# Patient Record
Sex: FEMALE | Race: WHITE | HISPANIC OR LATINO | Employment: STUDENT | URBAN - METROPOLITAN AREA
[De-identification: names, ages, dates, MRNs, and addresses within clinical notes are randomized per-mention and may not be internally consistent; named-entity substitution may affect disease eponyms.]

---

## 2017-03-22 ENCOUNTER — LAB VISIT (OUTPATIENT)
Dept: LAB | Facility: HOSPITAL | Age: 22
End: 2017-03-22
Attending: INTERNAL MEDICINE
Payer: COMMERCIAL

## 2017-03-22 ENCOUNTER — OFFICE VISIT (OUTPATIENT)
Dept: INTERNAL MEDICINE | Facility: CLINIC | Age: 22
End: 2017-03-22
Payer: COMMERCIAL

## 2017-03-22 VITALS
OXYGEN SATURATION: 98 % | TEMPERATURE: 98 F | HEIGHT: 61 IN | RESPIRATION RATE: 18 BRPM | WEIGHT: 99.19 LBS | DIASTOLIC BLOOD PRESSURE: 63 MMHG | HEART RATE: 77 BPM | SYSTOLIC BLOOD PRESSURE: 97 MMHG | BODY MASS INDEX: 18.73 KG/M2

## 2017-03-22 DIAGNOSIS — L65.9 HAIR LOSS: ICD-10-CM

## 2017-03-22 DIAGNOSIS — Z00.00 ANNUAL PHYSICAL EXAM: Primary | ICD-10-CM

## 2017-03-22 DIAGNOSIS — Z00.00 ANNUAL PHYSICAL EXAM: ICD-10-CM

## 2017-03-22 LAB
ALBUMIN SERPL BCP-MCNC: 3.7 G/DL
ALP SERPL-CCNC: 60 U/L
ALT SERPL W/O P-5'-P-CCNC: 14 U/L
ANION GAP SERPL CALC-SCNC: 4 MMOL/L
AST SERPL-CCNC: 15 U/L
BASOPHILS # BLD AUTO: 0.05 K/UL
BASOPHILS NFR BLD: 0.7 %
BILIRUB SERPL-MCNC: 0.4 MG/DL
BUN SERPL-MCNC: 7 MG/DL
CALCIUM SERPL-MCNC: 9.3 MG/DL
CHLORIDE SERPL-SCNC: 108 MMOL/L
CO2 SERPL-SCNC: 26 MMOL/L
CREAT SERPL-MCNC: 0.7 MG/DL
DIFFERENTIAL METHOD: ABNORMAL
EOSINOPHIL # BLD AUTO: 0.1 K/UL
EOSINOPHIL NFR BLD: 1.4 %
ERYTHROCYTE [DISTWIDTH] IN BLOOD BY AUTOMATED COUNT: 13.9 %
EST. GFR  (AFRICAN AMERICAN): >60 ML/MIN/1.73 M^2
EST. GFR  (NON AFRICAN AMERICAN): >60 ML/MIN/1.73 M^2
GLUCOSE SERPL-MCNC: 84 MG/DL
HCT VFR BLD AUTO: 39.9 %
HGB BLD-MCNC: 13 G/DL
LYMPHOCYTES # BLD AUTO: 2.2 K/UL
LYMPHOCYTES NFR BLD: 29.3 %
MCH RBC QN AUTO: 30.1 PG
MCHC RBC AUTO-ENTMCNC: 32.6 %
MCV RBC AUTO: 92 FL
MONOCYTES # BLD AUTO: 0.7 K/UL
MONOCYTES NFR BLD: 9.8 %
NEUTROPHILS # BLD AUTO: 4.3 K/UL
NEUTROPHILS NFR BLD: 57.8 %
PLATELET # BLD AUTO: 369 K/UL
PMV BLD AUTO: 10.3 FL
POTASSIUM SERPL-SCNC: 4 MMOL/L
PROT SERPL-MCNC: 7.3 G/DL
RBC # BLD AUTO: 4.32 M/UL
SODIUM SERPL-SCNC: 138 MMOL/L
T4 FREE SERPL-MCNC: 1.2 NG/DL
TSH SERPL DL<=0.005 MIU/L-ACNC: 2.44 UIU/ML
VIT B12 SERPL-MCNC: 485 PG/ML
WBC # BLD AUTO: 7.36 K/UL

## 2017-03-22 PROCEDURE — 82607 VITAMIN B-12: CPT

## 2017-03-22 PROCEDURE — 99213 OFFICE O/P EST LOW 20 MIN: CPT | Mod: PBBFAC,PO | Performed by: INTERNAL MEDICINE

## 2017-03-22 PROCEDURE — 85025 COMPLETE CBC W/AUTO DIFF WBC: CPT

## 2017-03-22 PROCEDURE — 84439 ASSAY OF FREE THYROXINE: CPT

## 2017-03-22 PROCEDURE — 99204 OFFICE O/P NEW MOD 45 MIN: CPT | Mod: S$GLB,,, | Performed by: INTERNAL MEDICINE

## 2017-03-22 PROCEDURE — 99999 PR PBB SHADOW E&M-EST. PATIENT-LVL III: CPT | Mod: PBBFAC,,, | Performed by: INTERNAL MEDICINE

## 2017-03-22 PROCEDURE — 84443 ASSAY THYROID STIM HORMONE: CPT

## 2017-03-22 PROCEDURE — 36415 COLL VENOUS BLD VENIPUNCTURE: CPT

## 2017-03-22 PROCEDURE — 80053 COMPREHEN METABOLIC PANEL: CPT

## 2017-03-22 NOTE — MR AVS SNAPSHOT
[facility]  [address]  [place] [address]  Phone: [phone]  Fax: [phone]                  Quinton Kc   3/22/2017 10:40 AM   Office Visit    Description:  Female : 1995   Provider:  Reina Chapman MD   Department:  [facility]           Reason for Visit     Establish Care           Diagnoses this Visit        Comments    Annual physical exam    -  Primary     Hair loss                To Do List           Future Appointments        Provider Department Dept Phone    3/22/2017 12:10 PM APPOINTMENT LAB, [facility] [phone]    2017 10:20 AM Reina Chapman MD [facility] [phone]      Goals (5 Years of Data)     None      Follow-Up and Disposition     Return in about 6 months (around 2017).      [facility]     [facility] -  Assistance  Registered nurses in the [facility] provide clinical advisement, health education, appointment booking, and other advisory services.  Call for this free service at [phone].             Medications           Message regarding Medications     Verify the changes and/or additions to your medication regime listed below are the same as discussed with your clinician today.  If any of these changes or additions are incorrect, please notify your healthcare provider.             Verify that the below list of medications is an accurate representation of the medications you are currently taking.  If none reported, the list may be blank. If incorrect, please contact your healthcare provider. Carry this list with you in case of emergency.           Current Medications     valacyclovir (VALTREX) 1000 MG tablet Take 1 tablet (1,000 mg total) by mouth 2 (two) times daily.           Clinical Reference Information           Your Vitals Were     BP Pulse Temp Resp Height Weight    97/63 77 98.3 °F (36.8 °C)  "(Oral) 18 5' 1" (1.549 m) 45 kg (99 lb 3.2 oz)    Last Period SpO2 BMI          03/14/2017 98% 18.74 kg/m2        Blood Pressure          Most Recent Value    BP  97/63      Allergies as of 3/22/2017     No Known Allergies      Immunizations Administered on Date of Encounter - 3/22/2017     None      Orders Placed During Today's Visit      Normal Orders This Visit    Ambulatory referral to Gynecology     Future Labs/Procedures Expected by Expires    CBC auto differential  3/22/2017 5/21/2018    Comprehensive metabolic panel  3/22/2017 5/21/2018    T4, free  3/22/2017 5/21/2018    TSH  3/22/2017 5/21/2018    Vitamin B12  3/22/2017 5/21/2018      Language Assistance Services     ATTENTION: Language assistance services are available, free of charge. Please call [phone].      ATENCIÓN: Si habla nataliya, tiene a hernandez disposición servicios gratuitos de asistencia lingüística. Llame al [phone].     CHÚ Ý: N?u b?n nói Ti?ng Vi?t, có các d?ch v? h? tr? ngôn ng? mi?n phí dành cho b?n. G?i s? [phone].         [facility] complies with applicable Federal civil rights laws and does not discriminate on the basis of race, color, national origin, age, disability, or sex.        "

## 2017-03-23 ENCOUNTER — OFFICE VISIT (OUTPATIENT)
Dept: OBSTETRICS AND GYNECOLOGY | Facility: CLINIC | Age: 22
End: 2017-03-23
Payer: COMMERCIAL

## 2017-03-23 VITALS
DIASTOLIC BLOOD PRESSURE: 74 MMHG | BODY MASS INDEX: 19.27 KG/M2 | HEIGHT: 61 IN | WEIGHT: 102.06 LBS | SYSTOLIC BLOOD PRESSURE: 102 MMHG

## 2017-03-23 DIAGNOSIS — Z01.419 WELL WOMAN EXAM WITH ROUTINE GYNECOLOGICAL EXAM: ICD-10-CM

## 2017-03-23 DIAGNOSIS — Z12.4 ROUTINE PAPANICOLAOU SMEAR: Primary | ICD-10-CM

## 2017-03-23 PROCEDURE — 99999 PR PBB SHADOW E&M-EST. PATIENT-LVL II: CPT | Mod: PBBFAC,,, | Performed by: OBSTETRICS & GYNECOLOGY

## 2017-03-23 PROCEDURE — 99385 PREV VISIT NEW AGE 18-39: CPT | Mod: S$GLB,,, | Performed by: OBSTETRICS & GYNECOLOGY

## 2017-03-23 PROCEDURE — 88175 CYTOPATH C/V AUTO FLUID REDO: CPT

## 2017-03-23 PROCEDURE — 99212 OFFICE O/P EST SF 10 MIN: CPT | Mod: PBBFAC,PO | Performed by: OBSTETRICS & GYNECOLOGY

## 2017-03-23 RX ORDER — NORGESTIMATE AND ETHINYL ESTRADIOL 7DAYSX3 LO
1 KIT ORAL DAILY
Qty: 28 TABLET | Refills: 11 | Status: SHIPPED | OUTPATIENT
Start: 2017-03-23 | End: 2018-03-08 | Stop reason: SDUPTHER

## 2017-03-23 NOTE — PROGRESS NOTES
"HPI:   21 y.o.   OB History      Para Term  AB TAB SAB Ectopic Multiple Living    2    2 2           Patient's last menstrual period was 2017 (exact date).    Patient is  here for her annual gynecologic exam.  She has no complaints.     ROS:  GENERAL: No fever, chills, fatigability or weight loss.  SKIN: No rashes, itching or changes in color or texture of skin.  HEAD: No headaches or recent head trauma.  EYES: Visual acuity fine. No photophobia, ocular pain or diplopia.  EARS: Denies ear pain, discharge or vertigo.  NOSE: No loss of smell, no epistaxis or postnasal drip.  MOUTH & THROAT: No hoarseness or change in voice. No excessive gum bleeding.  NODES: Denies swollen glands.  CHEST: Denies SHANKS, cyanosis, wheezing, cough and sputum production.  CARDIOVASCULAR: Denies chest pain, PND, orthopnea or reduced exercise tolerance.  ABDOMEN: Appetite fine. No weight loss. Denies diarrhea, abdominal pain, hematemesis or blood in stool.  URINARY: No flank pain, dysuria or hematuria.  PERIPHERAL VASCULAR: No claudication or cyanosis.  MUSCULOSKELETAL: No joint stiffness or swelling. Denies back pain.  NEUROLOGIC: No history of seizures, paralysis, alteration of gait or coordination.    PE:   /74  Ht 5' 1" (1.549 m)  Wt 46.3 kg (102 lb 1.2 oz)  LMP 2017 (Exact Date)  BMI 19.29 kg/m2  APPEARANCE: Well nourished, well developed, in no acute distress.  NECK: Neck symmetric without masses or thyromegaly.  BREASTS: Symmetrical, no skin changes or visible lesions. No palpable masses, nipple discharge or adenopathy bilaterally.  ABDOMEN: Flat. Soft. No tenderness or masses. No hepatosplenomegaly. No hernias. No CVA tenderness.  VULVA: No lesions. Normal female genitalia.  URETHRAL MEATUS: Normal size and location, no lesions, no prolapse.  URETHRA: No masses, tenderness, prolapse or scarring.  VAGINA: Moist and well rugated, no discharge, no significant cystocele or rectocele.  CERVIX: No lesions " and discharge. PAP done.  UTERUS: Normal size, regular shape, mobile, non-tender, bladder base nontender.  ADNEXA: No masses, tenderness or CDS nodularity.  ANUS PERINEUM: Normal.    PROCEDURES:  Pap smear    Assessment:  Normal Gynecologic Exam    Plan:  Mammogram and Colonoscopy if indicated by current recommendations.  Return to clinic in one year or for any problems or complaints.  Reg cycles  Wants ocp, on before

## 2017-03-29 NOTE — PROGRESS NOTES
Portions of this note are generated with voice recognition software. Typographical errors may exist.     Subjective:      Patient ID: Quinton Kc is a 21 y.o. female.    Chief Complaint: Establish Care    HPI:     Patient is here for an annual physical examination and to establish care with.  She denies any complaints.  On reviewing his she mentioned that she has had loss she noticed recently.  Patient denies any history of chest pain or shortness of breath.  No history of fever or chills.  No history of orthopnea or PND.  No history of dizziness or vertigo.  No history of nausea vomiting or diarrhea.  No history of hematemesis or melena.  No history of dysuria or hematuria.  No history of loss of strength in any extremities or loss of sensation.  No history of syncope seizures.                Review of patient's allergies indicates:  No Known Allergies  Past Medical History:   Diagnosis Date    Asthma     when younger     Past Surgical History:   Procedure Laterality Date    WISDOM TOOTH EXTRACTION       Family History   Problem Relation Age of Onset    Asthma Mother     Birth defects Neg Hx     Chromosomal disorder Neg Hx     Early death Neg Hx     Hyperlipidemia Neg Hx     Heart disease Neg Hx     Mental illness Neg Hx     Seizures Neg Hx     Thyroid disease Neg Hx     Other Neg Hx     Breast cancer Neg Hx     Colon cancer Neg Hx     Ovarian cancer Neg Hx     Diabetes Neg Hx     Hypertension Neg Hx      Social History     Social History    Marital status: Single     Spouse name: N/A    Number of children: N/A    Years of education: N/A     Occupational History    Not on file.     Social History Main Topics    Smoking status: Never Smoker    Smokeless tobacco: Not on file    Alcohol use No    Drug use: No    Sexual activity: Yes     Partners: Male     Birth control/ protection: Condom      Comment: condom use sometimes     Other Topics Concern    Not on file     Social History  Narrative    No petsLives with parents, 2 brothers, and grandparents         Review of SystemsAs above   Objective:     Physical Exam   Constitutional: She is oriented to person, place, and time. She appears well-developed and well-nourished. No distress.   HENT:   Head: Normocephalic and atraumatic.   Mouth/Throat: No oropharyngeal exudate.   Scalp no evidence of seborrhea.  No bruising had on pulling.   Eyes: EOM are normal. Pupils are equal, round, and reactive to light. Right eye exhibits no discharge. Left eye exhibits no discharge.   Neck: Normal range of motion. Neck supple. No tracheal deviation present. No thyromegaly present.   Cardiovascular: Normal rate and regular rhythm.  Exam reveals no friction rub.    No murmur heard.  Pulmonary/Chest: Effort normal and breath sounds normal. No respiratory distress. She has no wheezes. She has no rales.   Abdominal: Soft. Bowel sounds are normal. She exhibits no distension. There is no tenderness.   Musculoskeletal: Normal range of motion. She exhibits no edema or deformity.   Neurological: She is alert and oriented to person, place, and time. No cranial nerve deficit.   Skin: She is not diaphoretic.     Assessment:       Labs:    Recent Results (from the past 1008 hour(s))   CBC auto differential    Collection Time: 03/22/17 12:14 PM   Result Value Ref Range    WBC 7.36 3.90 - 12.70 K/uL    RBC 4.32 4.00 - 5.40 M/uL    Hemoglobin 13.0 12.0 - 16.0 g/dL    Hematocrit 39.9 37.0 - 48.5 %    MCV 92 82 - 98 fL    MCH 30.1 27.0 - 31.0 pg    MCHC 32.6 32.0 - 36.0 %    RDW 13.9 11.5 - 14.5 %    Platelets 369 (H) 150 - 350 K/uL    MPV 10.3 9.2 - 12.9 fL    Gran # 4.3 1.8 - 7.7 K/uL    Lymph # 2.2 1.0 - 4.8 K/uL    Mono # 0.7 0.3 - 1.0 K/uL    Eos # 0.1 0.0 - 0.5 K/uL    Baso # 0.05 0.00 - 0.20 K/uL    Gran% 57.8 38.0 - 73.0 %    Lymph% 29.3 18.0 - 48.0 %    Mono% 9.8 4.0 - 15.0 %    Eosinophil% 1.4 0.0 - 8.0 %    Basophil% 0.7 0.0 - 1.9 %    Differential Method Automated     Comprehensive metabolic panel    Collection Time: 03/22/17 12:14 PM   Result Value Ref Range    Sodium 138 136 - 145 mmol/L    Potassium 4.0 3.5 - 5.1 mmol/L    Chloride 108 95 - 110 mmol/L    CO2 26 23 - 29 mmol/L    Glucose 84 70 - 110 mg/dL    BUN, Bld 7 6 - 20 mg/dL    Creatinine 0.7 0.5 - 1.4 mg/dL    Calcium 9.3 8.7 - 10.5 mg/dL    Total Protein 7.3 6.0 - 8.4 g/dL    Albumin 3.7 3.5 - 5.2 g/dL    Total Bilirubin 0.4 0.1 - 1.0 mg/dL    Alkaline Phosphatase 60 55 - 135 U/L    AST 15 10 - 40 U/L    ALT 14 10 - 44 U/L    Anion Gap 4 (L) 8 - 16 mmol/L    eGFR if African American >60 >60 mL/min/1.73 m^2    eGFR if non African American >60 >60 mL/min/1.73 m^2   TSH    Collection Time: 03/22/17 12:14 PM   Result Value Ref Range    TSH 2.435 0.400 - 4.000 uIU/mL   T4, free    Collection Time: 03/22/17 12:14 PM   Result Value Ref Range    Free T4 1.20 0.71 - 1.51 ng/dL   Vitamin B12    Collection Time: 03/22/17 12:14 PM   Result Value Ref Range    Vitamin B-12 485 210 - 950 pg/mL     1. Annual physical exam    2. Hair loss      Patient is an overall good health.  Reassured her and explained to her about the phases of hair growth and loss.  No scalp condition causing hair loss.  We'll check the following labs and also vitamin B12 levels.  Refer to gynecology for Pap smear.  Follow-up in 6 months or earlier if required.        atient is an overall good health.  Procedures    CBC auto differential    Comprehensive metabolic panel    TSH    T4, free    Vitamin B12    Ambulatory referral to Gynecology     50 minutes spent during this visit of which greater than 50% devoted to face-face counseling and coordination of care regarding diagnosis and management plan

## 2017-05-01 ENCOUNTER — OFFICE VISIT (OUTPATIENT)
Dept: INTERNAL MEDICINE | Facility: CLINIC | Age: 22
End: 2017-05-01
Payer: COMMERCIAL

## 2017-05-01 VITALS
SYSTOLIC BLOOD PRESSURE: 95 MMHG | HEIGHT: 61 IN | DIASTOLIC BLOOD PRESSURE: 61 MMHG | OXYGEN SATURATION: 97 % | BODY MASS INDEX: 18.86 KG/M2 | HEART RATE: 81 BPM | WEIGHT: 99.88 LBS

## 2017-05-01 DIAGNOSIS — L65.9 HAIR LOSS: Primary | ICD-10-CM

## 2017-05-01 PROCEDURE — 99999 PR PBB SHADOW E&M-EST. PATIENT-LVL III: CPT | Mod: PBBFAC,,, | Performed by: INTERNAL MEDICINE

## 2017-05-01 PROCEDURE — 1160F RVW MEDS BY RX/DR IN RCRD: CPT | Mod: S$GLB,,, | Performed by: INTERNAL MEDICINE

## 2017-05-01 PROCEDURE — 99213 OFFICE O/P EST LOW 20 MIN: CPT | Mod: S$GLB,,, | Performed by: INTERNAL MEDICINE

## 2017-05-01 NOTE — PROGRESS NOTES
Subjective:    Portions of this note are generated with voice recognition software. Typographical errors may exist.   Patient ID: Quinton Kc is a 21 y.o. female.    Chief Complaint: Follow-up    HPI:  21-year-old girl here for a follow-up visit.  At previous visit she had mentioned that she has been having hair loss for the past several months.  No history of seborrhea or local lesions in the scalp.  She now mentions that she continues to have considerable hair loss and Review of patient's allergies  there has been thinning of her hair in front.  No changes in diet.  No history of weight loss or night sweats.  No new medications except for oral contraception.  No appetite changes.  No mood changes.    Past Medical History:   Diagnosis Date    Asthma     when younger     Past Surgical History:   Procedure Laterality Date    WISDOM TOOTH EXTRACTION       Family History   Problem Relation Age of Onset    Asthma Mother     Birth defects Neg Hx     Chromosomal disorder Neg Hx     Early death Neg Hx     Hyperlipidemia Neg Hx     Heart disease Neg Hx     Mental illness Neg Hx     Seizures Neg Hx     Thyroid disease Neg Hx     Other Neg Hx     Breast cancer Neg Hx     Colon cancer Neg Hx     Ovarian cancer Neg Hx     Diabetes Neg Hx     Hypertension Neg Hx      Social History     Social History    Marital status: Single     Spouse name: N/A    Number of children: N/A    Years of education: N/A     Occupational History    Not on file.     Social History Main Topics    Smoking status: Never Smoker    Smokeless tobacco: Not on file    Alcohol use No    Drug use: No    Sexual activity: Yes     Partners: Male     Birth control/ protection: Condom      Comment: condom use sometimes     Other Topics Concern    Not on file     Social History Narrative    No petsLives with parents, 2 brothers, and grandparents     ROS:  GENERAL:   SKIN:   HEAD: No headaches.  EYES: No Blurriing of vision  EARS: No ear  pain or changes in hearing.  NOSE: No congestion or rhinorrhea.  MOUTH & THROAT: No hoarseness, change in voice, or sore throat.  NODES: Denies swollen glands.  CHEST: Does chronically get some slight shortness of breath with going up her stairs. No chest pain. No acute worsening recently.  CARDIOVASCULAR: Denies chest pain, PND, orthopnea.  ABDOMEN: No nausea, vomiting, or changes in bowel function.  URINARY: No flank pain, dysuria or hematuria.  PERIPHERAL VASCULAR: No claudication or cyanosis.  MUSCULOSKELETAL: No joint stiffness or swelling. Denies back pain.  NEUROLOGIC: No weakness or numbness.    Physical Exam       Vital signs reviewed  PE:   APPEARANCE: Well nourished, well developed, in no acute distress.   HEAD: Normocephalic, atraumatic. Scalf: No local lesions.  Some thinning of hair the frontal area.    EYES: PERRL. EOMI. Conjunctivae noninjected.  EARS: TM's intact. Light reflex normal. No retraction or perforation  NOSE: Mucosa pink. Airway clear.  MOUTH & THROAT: No tonsillar enlargement. No pharyngeal erythema or exudate.   NECK: Supple with no cervical lymphadenopathy. No carotid bruits. No thyromegaly  CHEST: Good inspiratory effort. Lungs clear to auscultation with no wheezes or crackles.  CARDIOVASCULAR: Normal S1, S2. No rubs, murmurs, or gallops.  ABDOMEN: Bowel sounds normal. Not distended. Soft. No tenderness or masses. No organomegaly.  EXTREMITIES: No edema, cyanosis, or clubbing.     Assessment:     Labs:    Recent Results (from the past 1008 hour(s))   CBC auto differential    Collection Time: 03/22/17 12:14 PM   Result Value Ref Range    WBC 7.36 3.90 - 12.70 K/uL    RBC 4.32 4.00 - 5.40 M/uL    Hemoglobin 13.0 12.0 - 16.0 g/dL    Hematocrit 39.9 37.0 - 48.5 %    MCV 92 82 - 98 fL    MCH 30.1 27.0 - 31.0 pg    MCHC 32.6 32.0 - 36.0 %    RDW 13.9 11.5 - 14.5 %    Platelets 369 (H) 150 - 350 K/uL    MPV 10.3 9.2 - 12.9 fL    Gran # 4.3 1.8 - 7.7 K/uL    Lymph # 2.2 1.0 - 4.8 K/uL    Mono  # 0.7 0.3 - 1.0 K/uL    Eos # 0.1 0.0 - 0.5 K/uL    Baso # 0.05 0.00 - 0.20 K/uL    Gran% 57.8 38.0 - 73.0 %    Lymph% 29.3 18.0 - 48.0 %    Mono% 9.8 4.0 - 15.0 %    Eosinophil% 1.4 0.0 - 8.0 %    Basophil% 0.7 0.0 - 1.9 %    Differential Method Automated    Comprehensive metabolic panel    Collection Time: 03/22/17 12:14 PM   Result Value Ref Range    Sodium 138 136 - 145 mmol/L    Potassium 4.0 3.5 - 5.1 mmol/L    Chloride 108 95 - 110 mmol/L    CO2 26 23 - 29 mmol/L    Glucose 84 70 - 110 mg/dL    BUN, Bld 7 6 - 20 mg/dL    Creatinine 0.7 0.5 - 1.4 mg/dL    Calcium 9.3 8.7 - 10.5 mg/dL    Total Protein 7.3 6.0 - 8.4 g/dL    Albumin 3.7 3.5 - 5.2 g/dL    Total Bilirubin 0.4 0.1 - 1.0 mg/dL    Alkaline Phosphatase 60 55 - 135 U/L    AST 15 10 - 40 U/L    ALT 14 10 - 44 U/L    Anion Gap 4 (L) 8 - 16 mmol/L    eGFR if African American >60 >60 mL/min/1.73 m^2    eGFR if non African American >60 >60 mL/min/1.73 m^2   TSH    Collection Time: 03/22/17 12:14 PM   Result Value Ref Range    TSH 2.435 0.400 - 4.000 uIU/mL   T4, free    Collection Time: 03/22/17 12:14 PM   Result Value Ref Range    Free T4 1.20 0.71 - 1.51 ng/dL   Vitamin B12    Collection Time: 03/22/17 12:14 PM   Result Value Ref Range    Vitamin B-12 485 210 - 950 pg/mL     1. Hair loss        Plan:   Labs normal except for thrombocytosis which may be reactionary.  Refer to dermatology for further evaluation of hair loss.    Orders Placed This Encounter   Procedures    Ambulatory referral to Dermatology

## 2017-06-26 ENCOUNTER — HOSPITAL ENCOUNTER (EMERGENCY)
Facility: HOSPITAL | Age: 22
Discharge: HOME | End: 2017-06-26
Attending: EMERGENCY MEDICINE
Payer: COMMERCIAL

## 2017-06-26 VITALS
SYSTOLIC BLOOD PRESSURE: 128 MMHG | BODY MASS INDEX: 18.89 KG/M2 | WEIGHT: 100 LBS | RESPIRATION RATE: 17 BRPM | TEMPERATURE: 98 F | HEART RATE: 73 BPM | DIASTOLIC BLOOD PRESSURE: 85 MMHG | OXYGEN SATURATION: 99 %

## 2017-06-26 DIAGNOSIS — R11.10 VOMITING, INTRACTABILITY OF VOMITING NOT SPECIFIED, PRESENCE OF NAUSEA NOT SPECIFIED, UNSPECIFIED VOMITING TYPE: ICD-10-CM

## 2017-06-26 DIAGNOSIS — T50.902A SUICIDE ATTEMPT BY DRUG INGESTION, INITIAL ENCOUNTER: Primary | ICD-10-CM

## 2017-06-26 DIAGNOSIS — T40.602A: ICD-10-CM

## 2017-06-26 DIAGNOSIS — Z63.0 RELATIONSHIP PROBLEM BETWEEN PARTNERS: ICD-10-CM

## 2017-06-26 LAB
ALBUMIN SERPL BCP-MCNC: 4 G/DL
ALP SERPL-CCNC: 67 U/L
ALT SERPL W/O P-5'-P-CCNC: 15 U/L
AMPHET+METHAMPHET UR QL: NEGATIVE
ANION GAP SERPL CALC-SCNC: 14 MMOL/L
APAP SERPL-MCNC: 138 UG/ML
APAP SERPL-MCNC: 86 UG/ML
AST SERPL-CCNC: 20 U/L
B-HCG UR QL: NEGATIVE
BARBITURATES UR QL SCN>200 NG/ML: NEGATIVE
BASOPHILS NFR BLD: 0 %
BENZODIAZ UR QL SCN>200 NG/ML: NEGATIVE
BILIRUB SERPL-MCNC: 0.5 MG/DL
BILIRUB UR QL STRIP: NEGATIVE
BUN SERPL-MCNC: 11 MG/DL
BURR CELLS BLD QL SMEAR: ABNORMAL
BZE UR QL SCN: NEGATIVE
CALCIUM SERPL-MCNC: 9.2 MG/DL
CANNABINOIDS UR QL SCN: NEGATIVE
CHLORIDE SERPL-SCNC: 104 MMOL/L
CLARITY UR: CLEAR
CO2 SERPL-SCNC: 18 MMOL/L
COLOR UR: YELLOW
CREAT SERPL-MCNC: 0.8 MG/DL
CREAT UR-MCNC: 78.2 MG/DL
CTP QC/QA: YES
DACRYOCYTES BLD QL SMEAR: ABNORMAL
DIFFERENTIAL METHOD: ABNORMAL
EOSINOPHIL NFR BLD: 3 %
ERYTHROCYTE [DISTWIDTH] IN BLOOD BY AUTOMATED COUNT: 13.9 %
EST. GFR  (AFRICAN AMERICAN): >60 ML/MIN/1.73 M^2
EST. GFR  (NON AFRICAN AMERICAN): >60 ML/MIN/1.73 M^2
ETHANOL SERPL-MCNC: <10 MG/DL
GLUCOSE SERPL-MCNC: 204 MG/DL
GLUCOSE UR QL STRIP: NEGATIVE
HCT VFR BLD AUTO: 39.3 %
HGB BLD-MCNC: 12.9 G/DL
HGB UR QL STRIP: ABNORMAL
KETONES UR QL STRIP: ABNORMAL
LEUKOCYTE ESTERASE UR QL STRIP: NEGATIVE
LYMPHOCYTES NFR BLD: 41 %
MCH RBC QN AUTO: 29.1 PG
MCHC RBC AUTO-ENTMCNC: 32.8 %
MCV RBC AUTO: 89 FL
METHADONE UR QL SCN>300 NG/ML: NEGATIVE
MONOCYTES NFR BLD: 4 %
NEUTROPHILS NFR BLD: 51 %
NEUTS BAND NFR BLD MANUAL: 1 %
NITRITE UR QL STRIP: NEGATIVE
OPIATES UR QL SCN: NORMAL
PCP UR QL SCN>25 NG/ML: NEGATIVE
PH UR STRIP: 6 [PH] (ref 5–8)
PLATELET # BLD AUTO: 406 K/UL
PLATELET BLD QL SMEAR: ABNORMAL
PMV BLD AUTO: 9.7 FL
POIKILOCYTOSIS BLD QL SMEAR: SLIGHT
POTASSIUM SERPL-SCNC: 3.3 MMOL/L
PROT SERPL-MCNC: 7 G/DL
PROT UR QL STRIP: NEGATIVE
RBC # BLD AUTO: 4.43 M/UL
SODIUM SERPL-SCNC: 136 MMOL/L
SP GR UR STRIP: 1.02 (ref 1–1.03)
TOXICOLOGY INFORMATION: NORMAL
TSH SERPL DL<=0.005 MIU/L-ACNC: 2.18 UIU/ML
URN SPEC COLLECT METH UR: ABNORMAL
UROBILINOGEN UR STRIP-ACNC: NEGATIVE EU/DL
WBC # BLD AUTO: 12.5 K/UL

## 2017-06-26 PROCEDURE — 80320 DRUG SCREEN QUANTALCOHOLS: CPT

## 2017-06-26 PROCEDURE — 93010 ELECTROCARDIOGRAM REPORT: CPT | Mod: S$GLB,,, | Performed by: INTERNAL MEDICINE

## 2017-06-26 PROCEDURE — 81003 URINALYSIS AUTO W/O SCOPE: CPT

## 2017-06-26 PROCEDURE — 85027 COMPLETE CBC AUTOMATED: CPT

## 2017-06-26 PROCEDURE — 25000003 PHARM REV CODE 250: Performed by: EMERGENCY MEDICINE

## 2017-06-26 PROCEDURE — 80307 DRUG TEST PRSMV CHEM ANLYZR: CPT

## 2017-06-26 PROCEDURE — 80053 COMPREHEN METABOLIC PANEL: CPT

## 2017-06-26 PROCEDURE — 93005 ELECTROCARDIOGRAM TRACING: CPT

## 2017-06-26 PROCEDURE — 85007 BL SMEAR W/DIFF WBC COUNT: CPT

## 2017-06-26 PROCEDURE — 63600175 PHARM REV CODE 636 W HCPCS: Performed by: EMERGENCY MEDICINE

## 2017-06-26 PROCEDURE — C9113 INJ PANTOPRAZOLE SODIUM, VIA: HCPCS | Performed by: EMERGENCY MEDICINE

## 2017-06-26 PROCEDURE — 84443 ASSAY THYROID STIM HORMONE: CPT

## 2017-06-26 PROCEDURE — 99284 EMERGENCY DEPT VISIT MOD MDM: CPT | Mod: 25

## 2017-06-26 PROCEDURE — 96375 TX/PRO/DX INJ NEW DRUG ADDON: CPT

## 2017-06-26 PROCEDURE — 96376 TX/PRO/DX INJ SAME DRUG ADON: CPT

## 2017-06-26 PROCEDURE — 81025 URINE PREGNANCY TEST: CPT | Performed by: EMERGENCY MEDICINE

## 2017-06-26 PROCEDURE — 80329 ANALGESICS NON-OPIOID 1 OR 2: CPT

## 2017-06-26 PROCEDURE — 96374 THER/PROPH/DIAG INJ IV PUSH: CPT

## 2017-06-26 RX ORDER — NALOXONE HCL 0.4 MG/ML
1 VIAL (ML) INJECTION
Status: DISCONTINUED | OUTPATIENT
Start: 2017-06-26 | End: 2017-06-26

## 2017-06-26 RX ORDER — NALOXONE HCL 0.4 MG/ML
1 VIAL (ML) INJECTION
Status: COMPLETED | OUTPATIENT
Start: 2017-06-26 | End: 2017-06-26

## 2017-06-26 RX ORDER — ONDANSETRON 2 MG/ML
8 INJECTION INTRAMUSCULAR; INTRAVENOUS
Status: DISCONTINUED | OUTPATIENT
Start: 2017-06-26 | End: 2017-06-26

## 2017-06-26 RX ORDER — NALOXONE HCL 0.4 MG/ML
VIAL (ML) INJECTION
Status: DISCONTINUED
Start: 2017-06-26 | End: 2017-06-26 | Stop reason: HOSPADM

## 2017-06-26 RX ORDER — ONDANSETRON 2 MG/ML
4 INJECTION INTRAMUSCULAR; INTRAVENOUS
Status: COMPLETED | OUTPATIENT
Start: 2017-06-26 | End: 2017-06-26

## 2017-06-26 RX ORDER — ONDANSETRON 2 MG/ML
8 INJECTION INTRAMUSCULAR; INTRAVENOUS
Status: COMPLETED | OUTPATIENT
Start: 2017-06-26 | End: 2017-06-26

## 2017-06-26 RX ORDER — ONDANSETRON 2 MG/ML
INJECTION INTRAMUSCULAR; INTRAVENOUS
Status: DISCONTINUED
Start: 2017-06-26 | End: 2017-06-26 | Stop reason: HOSPADM

## 2017-06-26 RX ORDER — PANTOPRAZOLE SODIUM 40 MG/10ML
40 INJECTION, POWDER, LYOPHILIZED, FOR SOLUTION INTRAVENOUS
Status: COMPLETED | OUTPATIENT
Start: 2017-06-26 | End: 2017-06-26

## 2017-06-26 RX ADMIN — NALOXONE HYDROCHLORIDE 1 MG: 0.4 INJECTION, SOLUTION INTRAMUSCULAR; INTRAVENOUS; SUBCUTANEOUS at 04:06

## 2017-06-26 RX ADMIN — ACTIVATED CHARCOAL 50 G: 208 SUSPENSION ORAL at 04:06

## 2017-06-26 RX ADMIN — ONDANSETRON 8 MG: 2 INJECTION INTRAMUSCULAR; INTRAVENOUS at 04:06

## 2017-06-26 RX ADMIN — LIDOCAINE HYDROCHLORIDE: 20 SOLUTION ORAL; TOPICAL at 05:06

## 2017-06-26 RX ADMIN — ONDANSETRON 4 MG: 2 INJECTION INTRAMUSCULAR; INTRAVENOUS at 09:06

## 2017-06-26 RX ADMIN — PANTOPRAZOLE SODIUM 40 MG: 40 INJECTION, POWDER, FOR SOLUTION INTRAVENOUS at 06:06

## 2017-06-26 SDOH — SOCIAL DETERMINANTS OF HEALTH (SDOH): PROBLEMS IN RELATIONSHIP WITH SPOUSE OR PARTNER: Z63.0

## 2017-06-26 NOTE — ED NOTES
Patient complaining of throat irritation. MD will put in orders. Patient ambulating to bathroom for urine sample. Steady gait

## 2017-06-26 NOTE — ED NOTES
Report received from TIFFANY Tineo. Pt is awake, alert, cooperative. Boyfriend is at bedside. Sitter is at bedside for continuous one-on-one observation. Pt is now medically cleared, per Dr. Aguilar.

## 2017-06-26 NOTE — CONSULTS
Discharge this patient to home when medically stable.  Follow up with [facility] for counseling if needed

## 2017-06-26 NOTE — ED NOTES
Per Dr. Hathaway pt is ok to go home with her significant other and follow up with [facility] for counseling. Phone number given to pt for followup. Pt denies depression or any thoughts of harming herself at this time. Feels safe to go home

## 2017-06-26 NOTE — ED NOTES
Spoke with Aria with poison control center. Informed RN to redraw tylenol level at 6:30am and to make sure patient is AAO 4 hours after narcan given. Informed to start IV mucomyst is tylenol level still elevated

## 2017-06-26 NOTE — ED TRIAGE NOTES
Patient brought in by boyfriend. Boyfriend found pt  At the table writing out suicide note. Patient states she has been depressed from having an  In January. Patient took whole bottle of hydrocodone and Ibuprofen. Patient awake and alert after throwing up.
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
## 2017-06-28 DIAGNOSIS — T50.901A DRUG OVERDOSE: Primary | ICD-10-CM

## 2017-07-12 NOTE — PSYCH
"IDENTIFICATION DATA:  This is a 21-year-old single white female who was brought   to ER by boyfriend due to overdose on pills.  This consult is requested by ER   physician for psych evaluation.  The patient is on a PEC status.    CHIEF COMPLAINT:  "I took pills last night.  I thought my  is leaving   me."    HISTORY OF PRESENTING ILLNESS:  According to intake note, the patient overdosed   on hydrocodone, #20, ibuprofen close to the same amount because she thought that   her boyfriend is leaving after an argument.  The patient states that she had   good morning and evening, but at nighttime, she had an argument with the   boyfriend over the issue of not spending enough time and not showing affection.    The patient states that he left the house and after that she took hydrocodone   7.5 mg, #20 and ibuprofen 800 mg close to 20 in an attempt to kill herself.  The   patient states that she had suicidal thoughts, but never attempted.  The   patient states that her boyfriend called police and his timing is now very   awkward and he comes home at mid of the night and she does not get enough time   and they go to bed around 4.  The patient states that he is doing overtime and   she is not getting enough attention and time.  The patient states that she is   not a depressed person.  She denies crying spells, insomnia, but is a light   sleeper because she has to sleep when it is almost sunrise time.  The patient   states that she has ibuprofen and hydrocodone for her menstrual cramps and was   prescribed  when she had .  The patient does not drink.  She denies   use of other drugs.  Her urine drug screen is positive for opioids only.  She   was lethargic, drowsy and had pinpoint pupil.  She required Narcan for reversal   of opioid affect.  The patient states that she has no financial difficulties.    She is working as a  at the spa.  She denies any occupational   problem.  She has very limited " support from family.  She denies flashbacks,   nightmares, dreams, obsessive-compulsive features or eating problem.  She does   not sleep much because of 's job and her light sleep especially during   the daytime.    PAST PSYCHIATRIC HISTORY:  The patient has no previous in or outpatient   psychiatric treatment.     She has never been in alcohol and drug rehab programs.    She denies trouble with the law.    SOCIAL AND FAMILY HISTORY:  The patient was born and raised in [place].  She   described her childhood as okay.  She was raised by mom; dad was little bit in   the picture.  She has high school diploma.  She is a  in [employer].  She lives with a boyfriend.  She has no children.  Her mom suffered from   depression and she is not sure about her medications.  The patient denies family   history of suicide or drug problem.    MEDICAL HISTORY:  Unremarkable except acetaminophen level was 130 upon arrival   and it is now 86.  She required charcoal.    She has normal CBC and chemistry and liver functions with her bilirubin of 0.5,   AST 20, ALT 15.    ALLERGIS:  She is not allergic to any medicine or food.    MEDICATIONS:  She is not on any medicines.    Her blood pressure is 106/73 with 64 pulse.    MENTAL STATUS EXAMINATION:  This is a 21-year-old healthy-looking white female   who looks about her stated age.  She is alert, cooperative and oriented to day,   date, month and year.  Mood is slightly depressed with sad affect.  Psychomotor   activity is normal.  Speech is soft, clear, normal in amount, rate and tone.  No   mood swings or flight of ideas noted.  The patient denies auditory, visual,   tactile or olfactory hallucinations.  No delusional content noted.  Insight and   judgment are fair.  She is of average intelligence.  She admitted that she   overdosed on hydrocodone and ibuprofen because she thought that  is   leaving.  The patient has regrets about her suicide  attempt.    PSYCHIATRIC DIAGNOSES:  AXIS I:  Adjustment disorder with depressed mood.  AXIS II:  No diagnosis.  AXIS III:  No diagnosis.  AXIS IV:  Status post overdose.    RECOMMENDATIONS:  1.  The patient's  is not in favor of hospitalization.  The patient's    is trying to solve this problem by spending more time with her and she   is satisfied with that.  The patient has no more suicidal thoughts.  She has   regrets about her suicide attempt.  The  states that she is not a   depressed or anxious person and the patient was advised to have counseling with   [facility] for marital counseling if needed.  2.  We will discharge this patient back to home once medically cleared.  The   patient's  is comfortable with the discharge plan.        /joycelyn [id number] divine(s)        MARÍA  dd: 06/26/2017 10:13:25 (CDT)  td: 06/26/2017 15:35:56 (CDT)  Doc ID   #[id number]  Job ID #[id number]    CC:

## 2018-01-12 ENCOUNTER — OFFICE VISIT (OUTPATIENT)
Dept: FAMILY MEDICINE | Facility: CLINIC | Age: 23
End: 2018-01-12
Attending: FAMILY MEDICINE
Payer: COMMERCIAL

## 2018-01-12 VITALS
BODY MASS INDEX: 18.64 KG/M2 | SYSTOLIC BLOOD PRESSURE: 102 MMHG | WEIGHT: 98.75 LBS | HEART RATE: 89 BPM | HEIGHT: 61 IN | OXYGEN SATURATION: 100 % | DIASTOLIC BLOOD PRESSURE: 73 MMHG | TEMPERATURE: 98 F

## 2018-01-12 DIAGNOSIS — M76.892 TENDINITIS OF LEFT KNEE: ICD-10-CM

## 2018-01-12 DIAGNOSIS — M25.562 ACUTE PAIN OF LEFT KNEE: Primary | ICD-10-CM

## 2018-01-12 PROCEDURE — 99999 PR PBB SHADOW E&M-EST. PATIENT-LVL III: CPT | Mod: PBBFAC,,, | Performed by: FAMILY MEDICINE

## 2018-01-12 PROCEDURE — 99214 OFFICE O/P EST MOD 30 MIN: CPT | Mod: S$GLB,,, | Performed by: FAMILY MEDICINE

## 2018-01-12 RX ORDER — MELOXICAM 7.5 MG/1
7.5 TABLET ORAL DAILY
Qty: 30 TABLET | Refills: 0 | Status: SHIPPED | OUTPATIENT
Start: 2018-01-12

## 2018-01-12 NOTE — PROGRESS NOTES
Subjective:       Patient ID: Quinton Kc is a 22 y.o. female.    Chief Complaint: Knee Pain    22 yr old pleasant white female with no significant medical history presents today as new patient and establishing primary care and also complaining f left knee pain. Onset 2-3 weeks ago and persistent. She had some twisting injury while walking to her friend's apartment and the stairs were circular. She denies any fall or trauma. Since then she feels pain while bending over r walking in stairs. No effusion or redness. No fever or chills. Did not try any medication yet. Details as follows -      History as below - reviewed       Knee Pain    The injury mechanism was a twisting injury. The pain is present in the left knee. The quality of the pain is described as aching and cramping. The pain is at a severity of 5/10. The pain is moderate. The pain has been intermittent since onset. Pertinent negatives include no inability to bear weight, loss of motion, loss of sensation or muscle weakness. The symptoms are aggravated by movement, palpation and weight bearing. She has tried nothing for the symptoms. The treatment provided no relief.     Review of Systems   Constitutional: Negative.  Negative for activity change, diaphoresis and unexpected weight change.   HENT: Negative.  Negative for congestion, ear discharge, hearing loss, rhinorrhea, sore throat and voice change.    Eyes: Negative.  Negative for pain, discharge and visual disturbance.   Respiratory: Negative.  Negative for chest tightness, shortness of breath and wheezing.    Cardiovascular: Negative.  Negative for chest pain.   Gastrointestinal: Negative.  Negative for abdominal distention, anal bleeding, constipation and nausea.   Endocrine: Negative.  Negative for cold intolerance, polydipsia and polyuria.   Genitourinary: Negative.  Negative for decreased urine volume, difficulty urinating, dysuria, frequency, menstrual problem and vaginal pain.    Musculoskeletal: Positive for arthralgias and myalgias. Negative for gait problem.   Skin: Negative.  Negative for color change, pallor and wound.   Allergic/Immunologic: Negative.  Negative for environmental allergies and immunocompromised state.   Neurological: Negative.  Negative for dizziness, tremors, seizures, speech difficulty and headaches.   Hematological: Negative.  Negative for adenopathy. Does not bruise/bleed easily.   Psychiatric/Behavioral: Negative.  Negative for agitation, confusion, decreased concentration, hallucinations, self-injury and suicidal ideas. The patient is not nervous/anxious.        PMH/PSH/FH/SH/MED/ALLERGY reviewed    Objective:       Vitals:    01/12/18 0954   BP: 102/73   Pulse: 89   Temp: 98.3 °F (36.8 °C)       Physical Exam   Constitutional: She is oriented to person, place, and time. She appears well-developed and well-nourished. No distress.   HENT:   Head: Normocephalic and atraumatic.   Right Ear: External ear normal.   Left Ear: External ear normal.   Nose: Nose normal.   Mouth/Throat: Oropharynx is clear and moist. No oropharyngeal exudate.   Eyes: Conjunctivae and EOM are normal. Pupils are equal, round, and reactive to light. Right eye exhibits no discharge. Left eye exhibits no discharge. No scleral icterus.   Neck: Normal range of motion. Neck supple. No JVD present. No tracheal deviation present. No thyromegaly present.   Cardiovascular: Normal rate, regular rhythm, normal heart sounds and intact distal pulses.  Exam reveals no gallop and no friction rub.    No murmur heard.  Pulmonary/Chest: Effort normal and breath sounds normal. No stridor. She has no wheezes. She has no rales. She exhibits no tenderness.   Abdominal: Soft. Bowel sounds are normal. She exhibits no distension and no mass. There is no tenderness. There is no rebound and no guarding. No hernia.   Musculoskeletal: Normal range of motion. She exhibits tenderness (TTP medial and lateral joint line  left knee. No effusion. Negative varus and valgus stress andno ligament or meniscal strain.). She exhibits no edema.   Lymphadenopathy:     She has no cervical adenopathy.   Neurological: She is alert and oriented to person, place, and time. She has normal reflexes. She displays normal reflexes. No cranial nerve deficit. She exhibits normal muscle tone. Coordination normal.   Skin: Skin is warm and dry. No rash noted. She is not diaphoretic. No erythema. No pallor.   Psychiatric: She has a normal mood and affect. Her behavior is normal. Judgment and thought content normal.       Assessment:       1. Acute pain of left knee    2. Tendinitis of left knee        Plan:       Quinton was seen today for knee pain.    Diagnoses and all orders for this visit:    Acute pain of left knee  -     meloxicam (MOBIC) 7.5 MG tablet; Take 1 tablet (7.5 mg total) by mouth once daily.    Tendinitis of left knee  -     meloxicam (MOBIC) 7.5 MG tablet; Take 1 tablet (7.5 mg total) by mouth once daily.      Acute pain/tenditins left knee  -rest, ACE wrap  -aspercreme OTC cream - use as directed  -heat pads/ice packs  -mobic as needed x 2 weeks    Spent adequate time in obtaining history and explaining differentials    40 minutes spent during this visit of which greater than 50% devoted to face-face counseling and coordination of care regarding diagnosis and management plan    Return in about 2 months (around 3/12/2018), or if symptoms worsen or fail to improve.

## 2018-01-12 NOTE — PATIENT INSTRUCTIONS
Tendonitis  A tendon is the thick fibrous cord that joins muscle to bone and allows joints to move. When a tendon becomes inflamed, it is called tendonitis. This can occur from overuse, injury, or infection. This usually involves the shoulders, forearm, wrist, hands and foot. Symptoms include pain, swelling and tenderness to the touch. Moving the joint increases the pain.  It takes 4 to 6 weeks for tendonitis to heal. It is treated by preventing motion of the tendon with a splint or brace and the use of anti-inflammatory medicine.  Home care  · Some people find relief with ice packs. These can be crushed or cubed ice in a plastic bag or a bag of frozen vegetables wrapped in a thin towel. Other people get better relief with heat. This can include a hot shower, hot bath, or a moist towel warmed in a microwave. Try each and use the method that feels best, for 15 to 20 minutes several times a day.  · Rest the inflamed joint and protect it from movement.  · You may use over-the-counter ibuprofen or naproxen to treat pain and inflammation, unless another medicine was prescribed. If you can't take these medicines, acetaminophen may help with the pain, but does not treat inflammation. If you have chronic liver or kidney disease or ever had a stomach ulcer or gastrointestinal bleeding, talk with your doctor before using these medicines.  · As your symptoms improve, begin gradual motion at the involved joint.  Follow-up care  Follow up with your healthcare provider if you are not improving after 5 days of treatment.  When to seek medical advice  Call your healthcare provider right away if any of these occur:  · Redness over the painful area  · Increasing pain or swelling at the joint  · Fever (1 degree above your normal temperature) lasting 24 to 48 hours Or, whatever your healthcare provider told you to report based on your condition  Date Last Reviewed: 11/21/2015  © 9610-3956 [employer]. [address] [place] [address]  Road, [place] [address]. All rights reserved. This information is not intended as a substitute for professional medical care. Always follow your healthcare professional's instructions.

## 2018-08-22 ENCOUNTER — HOSPITAL ENCOUNTER (EMERGENCY)
Facility: HOSPITAL | Age: 23
Discharge: OTHER INSTITUTION | End: 2018-08-22
Attending: EMERGENCY MEDICINE
Payer: COMMERCIAL

## 2018-08-22 VITALS
DIASTOLIC BLOOD PRESSURE: 68 MMHG | HEIGHT: 61 IN | TEMPERATURE: 98 F | OXYGEN SATURATION: 98 % | RESPIRATION RATE: 18 BRPM | SYSTOLIC BLOOD PRESSURE: 132 MMHG | WEIGHT: 100 LBS | BODY MASS INDEX: 18.88 KG/M2 | HEART RATE: 69 BPM

## 2018-08-22 DIAGNOSIS — R45.89 AT RISK FOR SELF HARM: ICD-10-CM

## 2018-08-22 PROBLEM — S61.419A LACERATION OF HAND: Status: ACTIVE | Noted: 2018-08-22

## 2018-08-22 PROBLEM — M79.641 RIGHT HAND PAIN: Status: ACTIVE | Noted: 2018-08-22

## 2018-08-22 PROBLEM — F32.A DEPRESSION: Status: ACTIVE | Noted: 2018-08-22

## 2018-08-22 LAB
ALBUMIN SERPL BCP-MCNC: 3.8 G/DL
ALP SERPL-CCNC: 61 U/L
ALT SERPL W/O P-5'-P-CCNC: 13 U/L
AMPHET+METHAMPHET UR QL: NEGATIVE
ANION GAP SERPL CALC-SCNC: 9 MMOL/L
APAP SERPL-MCNC: <3 UG/ML
AST SERPL-CCNC: 15 U/L
B-HCG UR QL: NEGATIVE
BARBITURATES UR QL SCN>200 NG/ML: NEGATIVE
BASOPHILS # BLD AUTO: 0.01 K/UL
BASOPHILS NFR BLD: 0.1 %
BENZODIAZ UR QL SCN>200 NG/ML: NEGATIVE
BILIRUB SERPL-MCNC: 0.3 MG/DL
BILIRUB UR QL STRIP: NEGATIVE
BUN SERPL-MCNC: 9 MG/DL
BZE UR QL SCN: NEGATIVE
CALCIUM SERPL-MCNC: 9 MG/DL
CANNABINOIDS UR QL SCN: NEGATIVE
CHLORIDE SERPL-SCNC: 109 MMOL/L
CLARITY UR: CLEAR
CO2 SERPL-SCNC: 21 MMOL/L
COLOR UR: YELLOW
CREAT SERPL-MCNC: 0.7 MG/DL
CREAT UR-MCNC: 193.1 MG/DL
CTP QC/QA: YES
DIFFERENTIAL METHOD: ABNORMAL
EOSINOPHIL # BLD AUTO: 0.1 K/UL
EOSINOPHIL NFR BLD: 0.7 %
ERYTHROCYTE [DISTWIDTH] IN BLOOD BY AUTOMATED COUNT: 14 %
EST. GFR  (AFRICAN AMERICAN): >60 ML/MIN/1.73 M^2
EST. GFR  (NON AFRICAN AMERICAN): >60 ML/MIN/1.73 M^2
ETHANOL SERPL-MCNC: <10 MG/DL
GLUCOSE SERPL-MCNC: 97 MG/DL
GLUCOSE UR QL STRIP: NEGATIVE
HCT VFR BLD AUTO: 38.2 %
HGB BLD-MCNC: 12.2 G/DL
HGB UR QL STRIP: NEGATIVE
KETONES UR QL STRIP: ABNORMAL
LEUKOCYTE ESTERASE UR QL STRIP: NEGATIVE
LYMPHOCYTES # BLD AUTO: 1.6 K/UL
LYMPHOCYTES NFR BLD: 21.3 %
MCH RBC QN AUTO: 29.4 PG
MCHC RBC AUTO-ENTMCNC: 31.9 G/DL
MCV RBC AUTO: 92 FL
METHADONE UR QL SCN>300 NG/ML: NEGATIVE
MONOCYTES # BLD AUTO: 0.6 K/UL
MONOCYTES NFR BLD: 7.7 %
NEUTROPHILS # BLD AUTO: 5.1 K/UL
NEUTROPHILS NFR BLD: 70.2 %
NITRITE UR QL STRIP: NEGATIVE
OPIATES UR QL SCN: NEGATIVE
PCP UR QL SCN>25 NG/ML: NEGATIVE
PH UR STRIP: 6 [PH] (ref 5–8)
PLATELET # BLD AUTO: 306 K/UL
PMV BLD AUTO: 10.1 FL
POTASSIUM SERPL-SCNC: 3.8 MMOL/L
PROT SERPL-MCNC: 6.8 G/DL
PROT UR QL STRIP: NEGATIVE
RBC # BLD AUTO: 4.15 M/UL
SODIUM SERPL-SCNC: 139 MMOL/L
SP GR UR STRIP: >=1.03 (ref 1–1.03)
TOXICOLOGY INFORMATION: NORMAL
TSH SERPL DL<=0.005 MIU/L-ACNC: 1.42 UIU/ML
URN SPEC COLLECT METH UR: ABNORMAL
UROBILINOGEN UR STRIP-ACNC: NEGATIVE EU/DL
WBC # BLD AUTO: 7.26 K/UL

## 2018-08-22 PROCEDURE — 12001 RPR S/N/AX/GEN/TRNK 2.5CM/<: CPT | Mod: RT

## 2018-08-22 PROCEDURE — 85025 COMPLETE CBC W/AUTO DIFF WBC: CPT

## 2018-08-22 PROCEDURE — 25000003 PHARM REV CODE 250: Performed by: EMERGENCY MEDICINE

## 2018-08-22 PROCEDURE — 80329 ANALGESICS NON-OPIOID 1 OR 2: CPT

## 2018-08-22 PROCEDURE — 81003 URINALYSIS AUTO W/O SCOPE: CPT | Mod: 59

## 2018-08-22 PROCEDURE — 80307 DRUG TEST PRSMV CHEM ANLYZR: CPT

## 2018-08-22 PROCEDURE — 81025 URINE PREGNANCY TEST: CPT | Performed by: EMERGENCY MEDICINE

## 2018-08-22 PROCEDURE — 90471 IMMUNIZATION ADMIN: CPT | Performed by: EMERGENCY MEDICINE

## 2018-08-22 PROCEDURE — 84443 ASSAY THYROID STIM HORMONE: CPT

## 2018-08-22 PROCEDURE — 99285 EMERGENCY DEPT VISIT HI MDM: CPT | Mod: 25

## 2018-08-22 PROCEDURE — 80320 DRUG SCREEN QUANTALCOHOLS: CPT

## 2018-08-22 PROCEDURE — 63600175 PHARM REV CODE 636 W HCPCS: Performed by: EMERGENCY MEDICINE

## 2018-08-22 PROCEDURE — 80053 COMPREHEN METABOLIC PANEL: CPT

## 2018-08-22 PROCEDURE — 90715 TDAP VACCINE 7 YRS/> IM: CPT | Performed by: EMERGENCY MEDICINE

## 2018-08-22 RX ORDER — LIDOCAINE HYDROCHLORIDE 10 MG/ML
1 INJECTION INFILTRATION; PERINEURAL
Status: COMPLETED | OUTPATIENT
Start: 2018-08-22 | End: 2018-08-22

## 2018-08-22 RX ORDER — BACITRACIN 500 [USP'U]/G
OINTMENT TOPICAL
Status: COMPLETED | OUTPATIENT
Start: 2018-08-22 | End: 2018-08-22

## 2018-08-22 RX ORDER — IBUPROFEN 600 MG/1
600 TABLET ORAL
Status: COMPLETED | OUTPATIENT
Start: 2018-08-22 | End: 2018-08-22

## 2018-08-22 RX ADMIN — CLOSTRIDIUM TETANI TOXOID ANTIGEN (FORMALDEHYDE INACTIVATED), CORYNEBACTERIUM DIPHTHERIAE TOXOID ANTIGEN (FORMALDEHYDE INACTIVATED), BORDETELLA PERTUSSIS TOXOID ANTIGEN (GLUTARALDEHYDE INACTIVATED), BORDETELLA PERTUSSIS FILAMENTOUS HEMAGGLUTININ ANTIGEN (FORMALDEHYDE INACTIVATED), BORDETELLA PERTUSSIS PERTACTIN ANTIGEN, AND BORDETELLA PERTUSSIS FIMBRIAE 2/3 ANTIGEN 0.5 ML: 5; 2; 2.5; 5; 3; 5 INJECTION, SUSPENSION INTRAMUSCULAR at 03:08

## 2018-08-22 RX ADMIN — Medication 5 ML: at 03:08

## 2018-08-22 RX ADMIN — IBUPROFEN 600 MG: 600 TABLET ORAL at 08:08

## 2018-08-22 RX ADMIN — LIDOCAINE HYDROCHLORIDE 1 ML: 10 INJECTION, SOLUTION INFILTRATION; PERINEURAL at 03:08

## 2018-08-22 RX ADMIN — BACITRACIN: 500 OINTMENT TOPICAL at 03:08
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
## 2018-08-22 NOTE — ED NOTES
Report received. Care assumed. Pt AAOx4. Respirations even and unlabored. Pt VSS.    Pt denies SI, HI, depression, hallucinations, delusions, aggression, and hopelessness. Verbal contract established with pt to verbalize thoughts/feelings of SI, HI, depression, hallucinations, delusions, aggression, and hopelessness to Ed staff if thoughts/feelings should arise or escalate. Pt agrees to verbal contract.       Pt c/o pain to right hand. Denies wanting any medication for pain.     Pt states that she does not wish to disclose any of her medical information with family or friends.     Denies needs/wants/complaints at this time. Risk sitter at BS. Will continue to monitor closely.

## 2018-08-22 NOTE — ED NOTES
Pt changed into paper scrubs. 1 bag of belongings (clothing and shoes) placed in secure closet near nurses station. Risk Valeria resendiz, at bedside.

## 2018-08-22 NOTE — ED NOTES
Patient accepted by [facility] at [facility] ([address], [place], [place]) for the service of Rivka Montez NP.  Report to be called to [phone].

## 2018-08-22 NOTE — ED NOTES
Admit packet faxed to [facility], [facility], [facility], [facility], [facility], [facility], [facility], [place].  Waiting for response.
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
## 2018-08-29 ENCOUNTER — TELEPHONE (OUTPATIENT)
Dept: FAMILY MEDICINE | Facility: CLINIC | Age: 23
End: 2018-08-29

## 2018-08-29 NOTE — TELEPHONE ENCOUNTER
----- Message from Mckenna Pan sent at 8/29/2018  2:11 PM CDT -----  No. [phone]   Patient has 5 stitches in her hand from the ER.   They need to be removed this week.   Please call.

## 2018-08-31 ENCOUNTER — OFFICE VISIT (OUTPATIENT)
Dept: FAMILY MEDICINE | Facility: CLINIC | Age: 23
End: 2018-08-31
Attending: FAMILY MEDICINE
Payer: COMMERCIAL

## 2018-08-31 VITALS
BODY MASS INDEX: 18.28 KG/M2 | OXYGEN SATURATION: 98 % | DIASTOLIC BLOOD PRESSURE: 72 MMHG | HEIGHT: 61 IN | HEART RATE: 82 BPM | SYSTOLIC BLOOD PRESSURE: 115 MMHG | WEIGHT: 96.81 LBS

## 2018-08-31 DIAGNOSIS — S61.411A LACERATION OF RIGHT HAND, FOREIGN BODY PRESENCE UNSPECIFIED, INITIAL ENCOUNTER: ICD-10-CM

## 2018-08-31 DIAGNOSIS — Z48.02 ENCOUNTER FOR REMOVAL OF SUTURES: Primary | ICD-10-CM

## 2018-08-31 PROCEDURE — 99999 PR PBB SHADOW E&M-EST. PATIENT-LVL III: CPT | Mod: PBBFAC,,, | Performed by: FAMILY MEDICINE

## 2018-08-31 PROCEDURE — 3008F BODY MASS INDEX DOCD: CPT | Mod: CPTII,S$GLB,, | Performed by: FAMILY MEDICINE

## 2018-08-31 PROCEDURE — 99024 POSTOP FOLLOW-UP VISIT: CPT | Mod: S$GLB,,, | Performed by: FAMILY MEDICINE

## 2018-08-31 PROCEDURE — 99214 OFFICE O/P EST MOD 30 MIN: CPT | Mod: S$GLB,,, | Performed by: FAMILY MEDICINE

## 2018-08-31 NOTE — PROGRESS NOTES
Subjective:       Patient ID: Quinton Kc is a 22 y.o. female.    Chief Complaint: Suture / Staple Removal (Palm of Right Hand) and Elevated cholesterol (Labs done 1 wk ago)    22 yr old pleasant white female with no significant medical history presents today for evaluation of right hand pain and suture removal. She went to Er 10 days ago after self inflicted wound right hand with sharp knife. She had 5 sutures placed and she has no complaints otherwise. She still has pain but it is much better since 10 days. No fever, discharge or redness. Details as follows     History as below - reviewed       Suture / Staple Removal   The sutures were placed 7 to 10 days ago. She tried nothing since the wound repair. The treatment provided moderate relief. There has been no drainage from the wound. There is no redness present. There is no swelling present. The pain has improved.     Review of Systems   Constitutional: Negative.  Negative for activity change, diaphoresis and unexpected weight change.   HENT: Negative for congestion, ear discharge, hearing loss, rhinorrhea, sore throat, trouble swallowing and voice change.    Eyes: Negative.  Negative for pain, discharge and visual disturbance.   Respiratory: Negative for chest tightness, shortness of breath and wheezing.    Cardiovascular: Negative.  Negative for chest pain and palpitations.   Gastrointestinal: Negative.  Negative for abdominal distention, anal bleeding, blood in stool, constipation, diarrhea, nausea and vomiting.   Endocrine: Negative.  Negative for cold intolerance, polydipsia and polyuria.   Genitourinary: Negative.  Negative for decreased urine volume, difficulty urinating, dysuria, frequency, hematuria, menstrual problem and vaginal pain.   Musculoskeletal: Negative.  Negative for arthralgias, gait problem, joint swelling, myalgias and neck pain.   Skin: Positive for rash and wound. Negative for color change and pallor.   Allergic/Immunologic: Negative.   Negative for environmental allergies and immunocompromised state.   Neurological: Negative.  Negative for dizziness, tremors, seizures, speech difficulty, weakness and headaches.   Hematological: Negative.  Negative for adenopathy. Does not bruise/bleed easily.   Psychiatric/Behavioral: Negative.  Negative for agitation, confusion, decreased concentration, dysphoric mood, hallucinations, self-injury and suicidal ideas. The patient is not nervous/anxious.        PMH/PSH/FH/SH/MED/ALLERGY reviewed    Objective:       Vitals:    08/31/18 0905   BP: 115/72   Pulse: 82       Physical Exam   Constitutional: She is oriented to person, place, and time. She appears well-developed and well-nourished. No distress.   HENT:   Head: Normocephalic and atraumatic.   Right Ear: External ear normal.   Left Ear: External ear normal.   Nose: Nose normal.   Mouth/Throat: Oropharynx is clear and moist. No oropharyngeal exudate.   Eyes: Conjunctivae and EOM are normal. Pupils are equal, round, and reactive to light. Right eye exhibits no discharge. Left eye exhibits no discharge. No scleral icterus.   Neck: Normal range of motion. Neck supple. No JVD present. No tracheal deviation present. No thyromegaly present.   Cardiovascular: Normal rate, regular rhythm, normal heart sounds and intact distal pulses. Exam reveals no gallop and no friction rub.   No murmur heard.  Pulmonary/Chest: Effort normal and breath sounds normal. No stridor. She has no wheezes. She has no rales. She exhibits no tenderness.   Abdominal: Soft. Bowel sounds are normal. She exhibits no distension and no mass. There is no tenderness. There is no rebound and no guarding. No hernia.   Musculoskeletal: Normal range of motion. She exhibits no edema or tenderness.   Lymphadenopathy:     She has no cervical adenopathy.   Neurological: She is alert and oriented to person, place, and time. She has normal reflexes. She displays normal reflexes. No cranial nerve deficit. She  exhibits normal muscle tone. Coordination normal.   Skin: Skin is warm and dry. No rash noted. She is not diaphoretic. No erythema. No pallor.        Psychiatric: She has a normal mood and affect. Her behavior is normal. Judgment and thought content normal.       Assessment:       1. Laceration of right hand, foreign body presence unspecified, initial encounter    2. Encounter for removal of sutures        Plan:     Quinton was seen today for suture / staple removal and elevated cholesterol.    Diagnoses and all orders for this visit:    Encounter for removal of sutures    Laceration of right hand, foreign body presence unspecified, initial encounter      Laceration right hand s/p sutures  -suture removal done with no problem - 5 sutures removed safeluy and post procedure precautions given  -ER precautions given    Spent adequate time in obtaining history and explaining differentials    40 minutes spent during this visit of which greater than 50% devoted to face-face counseling and coordination of care regarding diagnosis and management plan    Follow-up if symptoms worsen or fail to improve.

## 2018-12-20 RX ORDER — NORGESTIMATE AND ETHINYL ESTRADIOL 7DAYSX3 LO
KIT ORAL
Qty: 28 TABLET | Refills: 0 | Status: SHIPPED | OUTPATIENT
Start: 2018-12-20

## 2019-01-15 RX ORDER — NORGESTIMATE AND ETHINYL ESTRADIOL 7DAYSX3 LO
KIT ORAL
Qty: 28 TABLET | Refills: 0 | OUTPATIENT
Start: 2019-01-15

## 2020-02-05 ENCOUNTER — PATIENT MESSAGE (OUTPATIENT)
Dept: ADMINISTRATIVE | Facility: HOSPITAL | Age: 25
End: 2020-02-05

## 2020-02-05 ENCOUNTER — PATIENT OUTREACH (OUTPATIENT)
Dept: ADMINISTRATIVE | Facility: HOSPITAL | Age: 25
End: 2020-02-05

## 2020-05-22 ENCOUNTER — PATIENT OUTREACH (OUTPATIENT)
Dept: ADMINISTRATIVE | Facility: HOSPITAL | Age: 25
End: 2020-05-22

## 2020-10-05 ENCOUNTER — PATIENT MESSAGE (OUTPATIENT)
Dept: ADMINISTRATIVE | Facility: HOSPITAL | Age: 25
End: 2020-10-05

## 2021-01-04 ENCOUNTER — PATIENT MESSAGE (OUTPATIENT)
Dept: ADMINISTRATIVE | Facility: HOSPITAL | Age: 26
End: 2021-01-04

## 2021-04-12 ENCOUNTER — PATIENT MESSAGE (OUTPATIENT)
Dept: ADMINISTRATIVE | Facility: HOSPITAL | Age: 26
End: 2021-04-12

## 2021-04-15 ENCOUNTER — PATIENT MESSAGE (OUTPATIENT)
Dept: RESEARCH | Facility: HOSPITAL | Age: 26
End: 2021-04-15

## 2021-07-06 ENCOUNTER — PATIENT MESSAGE (OUTPATIENT)
Dept: ADMINISTRATIVE | Facility: HOSPITAL | Age: 26
End: 2021-07-06